# Patient Record
Sex: FEMALE | Race: WHITE | NOT HISPANIC OR LATINO | Employment: FULL TIME | URBAN - METROPOLITAN AREA
[De-identification: names, ages, dates, MRNs, and addresses within clinical notes are randomized per-mention and may not be internally consistent; named-entity substitution may affect disease eponyms.]

---

## 2023-09-02 PROBLEM — K21.9 GERD (GASTROESOPHAGEAL REFLUX DISEASE): Status: ACTIVE | Noted: 2023-09-02

## 2023-09-02 PROBLEM — R87.810 ASCUS WITH POSITIVE HIGH RISK HPV CERVICAL: Status: ACTIVE | Noted: 2023-09-02

## 2023-09-02 PROBLEM — R87.610 ASCUS WITH POSITIVE HIGH RISK HPV CERVICAL: Status: ACTIVE | Noted: 2023-09-02

## 2023-09-02 PROBLEM — N92.1 BREAKTHROUGH BLEEDING WITH IUD: Status: ACTIVE | Noted: 2023-09-02

## 2023-09-02 PROBLEM — K62.89 ANORECTAL PAIN: Status: ACTIVE | Noted: 2023-09-02

## 2023-09-02 PROBLEM — N93.8 DYSFUNCTIONAL UTERINE BLEEDING: Status: ACTIVE | Noted: 2023-09-02

## 2023-09-02 PROBLEM — A56.09 CHLAMYDIA TRACHOMATIS INFECTION OF LOWER GENITOURINARY SITE: Status: ACTIVE | Noted: 2023-09-02

## 2023-09-02 PROBLEM — F41.9 ANXIETY DISORDER: Status: ACTIVE | Noted: 2023-09-02

## 2023-09-02 PROBLEM — K60.2 ANAL FISSURE: Status: ACTIVE | Noted: 2023-09-02

## 2023-09-02 PROBLEM — Z97.5 BREAKTHROUGH BLEEDING WITH IUD: Status: ACTIVE | Noted: 2023-09-02

## 2023-09-02 PROBLEM — F90.9 ATTENTION-DEFICIT HYPERACTIVITY DISORDER, UNSPECIFIED TYPE: Status: ACTIVE | Noted: 2023-09-02

## 2023-09-02 PROBLEM — L65.9 HAIR LOSS: Status: ACTIVE | Noted: 2023-09-02

## 2023-09-02 PROBLEM — N90.89 VULVAR FISSURE: Status: ACTIVE | Noted: 2023-09-02

## 2023-09-02 RX ORDER — FLUOXETINE 10 MG/1
10 CAPSULE ORAL DAILY
COMMUNITY
Start: 2020-09-25 | End: 2023-10-23 | Stop reason: SDUPTHER

## 2023-09-02 RX ORDER — LEVONORGESTREL 19.5 MG/1
INTRAUTERINE DEVICE INTRAUTERINE
COMMUNITY

## 2023-09-02 RX ORDER — MULTIVITAMIN
TABLET ORAL
COMMUNITY

## 2023-09-02 RX ORDER — DEXTROAMPHETAMINE SACCHARATE, AMPHETAMINE ASPARTATE MONOHYDRATE, DEXTROAMPHETAMINE SULFATE AND AMPHETAMINE SULFATE 5; 5; 5; 5 MG/1; MG/1; MG/1; MG/1
1 CAPSULE, EXTENDED RELEASE ORAL DAILY
COMMUNITY
Start: 2022-06-16 | End: 2023-10-10 | Stop reason: SDUPTHER

## 2023-09-02 RX ORDER — SULFAMETHOXAZOLE AND TRIMETHOPRIM 800; 160 MG/1; MG/1
1 TABLET ORAL 2 TIMES DAILY
COMMUNITY
Start: 2022-07-18 | End: 2024-05-02 | Stop reason: WASHOUT

## 2023-09-27 LAB
CHLAMYDIA TRACH., AMPLIFIED: NEGATIVE
CLUE CELLS: NORMAL
N. GONORRHEA, AMPLIFIED: NEGATIVE
NUGENT SCORE: 0
YEAST: NORMAL

## 2023-10-09 PROBLEM — N89.8 VAGINAL DISCHARGE: Status: ACTIVE | Noted: 2023-10-09

## 2023-10-10 ENCOUNTER — TELEMEDICINE (OUTPATIENT)
Dept: BEHAVIORAL HEALTH | Facility: CLINIC | Age: 28
End: 2023-10-10
Payer: COMMERCIAL

## 2023-10-10 DIAGNOSIS — F41.3 OTHER MIXED ANXIETY DISORDERS: ICD-10-CM

## 2023-10-10 DIAGNOSIS — Z00.00 HEALTH CARE MAINTENANCE: ICD-10-CM

## 2023-10-10 DIAGNOSIS — F90.8 ATTENTION DEFICIT HYPERACTIVITY DISORDER (ADHD), OTHER TYPE: ICD-10-CM

## 2023-10-10 DIAGNOSIS — Z79.899 MEDICATION MANAGEMENT: ICD-10-CM

## 2023-10-10 PROCEDURE — 99214 OFFICE O/P EST MOD 30 MIN: CPT | Performed by: CLINICAL NURSE SPECIALIST

## 2023-10-10 RX ORDER — DEXTROAMPHETAMINE SACCHARATE, AMPHETAMINE ASPARTATE MONOHYDRATE, DEXTROAMPHETAMINE SULFATE AND AMPHETAMINE SULFATE 5; 5; 5; 5 MG/1; MG/1; MG/1; MG/1
20 CAPSULE, EXTENDED RELEASE ORAL EVERY MORNING
Qty: 30 CAPSULE | Refills: 0 | Status: SHIPPED | OUTPATIENT
Start: 2023-11-09 | End: 2024-02-01 | Stop reason: SDUPTHER

## 2023-10-10 RX ORDER — DEXTROAMPHETAMINE SACCHARATE, AMPHETAMINE ASPARTATE MONOHYDRATE, DEXTROAMPHETAMINE SULFATE AND AMPHETAMINE SULFATE 5; 5; 5; 5 MG/1; MG/1; MG/1; MG/1
20 CAPSULE, EXTENDED RELEASE ORAL EVERY MORNING
Qty: 30 CAPSULE | Refills: 0 | Status: SHIPPED | OUTPATIENT
Start: 2023-10-10 | End: 2024-02-01 | Stop reason: SDUPTHER

## 2023-10-10 RX ORDER — DEXTROAMPHETAMINE SACCHARATE, AMPHETAMINE ASPARTATE MONOHYDRATE, DEXTROAMPHETAMINE SULFATE AND AMPHETAMINE SULFATE 5; 5; 5; 5 MG/1; MG/1; MG/1; MG/1
20 CAPSULE, EXTENDED RELEASE ORAL EVERY MORNING
Qty: 30 CAPSULE | Refills: 0 | Status: SHIPPED | OUTPATIENT
Start: 2023-12-09 | End: 2024-02-01 | Stop reason: SDUPTHER

## 2023-10-10 NOTE — PROGRESS NOTES
"Outpatient Psychiatry      Subjective   Yumiko Paredes, is a 28 y.o. female,  seen in follow-up for ADHD and anxiety.       Assessment/Plan   Diagnoses:   Encounter Diagnoses   Name Primary?    Attention deficit hyperactivity disorder (ADHD), other type     Other mixed anxiety disorders        Assessment:    Tolerating and benefiting from current regimen. Can consider resuming low-dose fluoxetine for anxiety if sx persist or worsen. She prefers not to restart at this point, and instead would like to focus on nonpharmacologic strategies that have effectively mitigated anxiety in the past.     Will continue Adderall at current dose for ADHD.     Treatment Plan/Recommendations:   Continue Adderall ER 20 mg PO daily- refills sent to pharmacy today.   Orders for annual UDS (controlled rx monitoring) placed today.     Follow-up in 3 months.       Reason for Visit:  Follow-up for medication management.     HPI:  Since her last visit,     Past couple of weeks noticing anxiety has been really, really bad.  Not sure if it's because things slowed down at work and she had more time to think.   \"Just over-thinking everything,\" worrying about   This past week has been, \"scott back to normal.\"   Wasn't exercising or doing as much self-care because she was so busy, starting to get back into that more.     On days that anxiety was worse, seemed more difficult to get work done.   Noticed feeling more anxious toward the evening when medication was wearing off.   Sleeping well.   No significant changes in appetite or weight.   Energy level has been good.   No panic or anxiety attacks.     Denies suicidal ideation or a passive death wish.     No new medications or health problems.       Current Medications:    Current Outpatient Medications:     amphetamine-dextroamphetamine XR (Adderall XR) 20 mg 24 hr capsule, Take 1 capsule (20 mg) by mouth once daily. For ADHD, Disp: , Rfl:     FLUoxetine (PROzac) 10 mg capsule, Take 1 capsule (10 " "mg) by mouth once daily., Disp: , Rfl:     levonorgestreL (Kyleena) 17.5 mcg/24 hrs (5 yrs) 19.5 mg intrauterine device, by intrauterine route., Disp: , Rfl:     multivitamin tablet, Take by mouth., Disp: , Rfl:     sulfamethoxazole-trimethoprim (Bactrim DS) 800-160 mg tablet, Take 1 tablet by mouth 2 times a day., Disp: , Rfl:       Record Review: moderate     Medical Review Of Systems:  Pertinent items are noted in HPI.    Psychiatric Review Of Systems:  As noted in HPI.        Objective   Mental Status Exam  General: well groomed, appropriate eye contact  Psychomotor: no psychomotor agitation or retardation, no tremor or other abnormal movements  Speech: Clear, Non-pressured, and spontaneous  Mood: \"It's been pretty good.\"  Affect:euthymic, full-range and congruent with mood and topic of conversation  Thought Process: linear, goal directed  Perception: Denies auditory or visual hallucinations. Does not appear internally stimulated.   Thought Content: No suicidal ideation. No violent thinking. No delusional content.   Cognition: cognitively intact to conversational testing with respect to attention, orientation, fund of knowledge, recent and remote memory, and language  Insight: intact  Judgement: intact       Vitals:  There were no vitals filed for this visit.      Felisha Milton, APRN-CNP, APRN-CNS  "

## 2023-10-11 DIAGNOSIS — F90.8 ATTENTION DEFICIT HYPERACTIVITY DISORDER (ADHD), OTHER TYPE: ICD-10-CM

## 2023-10-11 DIAGNOSIS — F41.3 OTHER MIXED ANXIETY DISORDERS: ICD-10-CM

## 2023-10-23 ENCOUNTER — TELEPHONE (OUTPATIENT)
Dept: BEHAVIORAL HEALTH | Facility: CLINIC | Age: 28
End: 2023-10-23
Payer: COMMERCIAL

## 2023-10-23 DIAGNOSIS — F41.3 OTHER MIXED ANXIETY DISORDERS: ICD-10-CM

## 2023-10-23 RX ORDER — FLUOXETINE 10 MG/1
10 CAPSULE ORAL DAILY
Qty: 30 CAPSULE | Refills: 2 | Status: SHIPPED | OUTPATIENT
Start: 2023-10-23 | End: 2024-01-18

## 2024-01-09 ENCOUNTER — APPOINTMENT (OUTPATIENT)
Dept: BEHAVIORAL HEALTH | Facility: CLINIC | Age: 29
End: 2024-01-09
Payer: COMMERCIAL

## 2024-01-18 DIAGNOSIS — F41.3 OTHER MIXED ANXIETY DISORDERS: ICD-10-CM

## 2024-01-18 RX ORDER — FLUOXETINE 10 MG/1
CAPSULE ORAL
Qty: 30 CAPSULE | Refills: 2 | Status: SHIPPED | OUTPATIENT
Start: 2024-01-18 | End: 2024-02-01 | Stop reason: SDUPTHER

## 2024-02-01 ENCOUNTER — TELEMEDICINE (OUTPATIENT)
Dept: BEHAVIORAL HEALTH | Facility: CLINIC | Age: 29
End: 2024-02-01
Payer: COMMERCIAL

## 2024-02-01 DIAGNOSIS — F90.8 ATTENTION DEFICIT HYPERACTIVITY DISORDER (ADHD), OTHER TYPE: ICD-10-CM

## 2024-02-01 DIAGNOSIS — F41.3 OTHER MIXED ANXIETY DISORDERS: ICD-10-CM

## 2024-02-01 PROCEDURE — 99213 OFFICE O/P EST LOW 20 MIN: CPT | Performed by: CLINICAL NURSE SPECIALIST

## 2024-02-01 RX ORDER — FLUOXETINE 10 MG/1
10 CAPSULE ORAL DAILY
Qty: 30 CAPSULE | Refills: 5 | Status: SHIPPED | OUTPATIENT
Start: 2024-02-01 | End: 2024-07-30

## 2024-02-01 RX ORDER — DEXTROAMPHETAMINE SACCHARATE, AMPHETAMINE ASPARTATE MONOHYDRATE, DEXTROAMPHETAMINE SULFATE AND AMPHETAMINE SULFATE 5; 5; 5; 5 MG/1; MG/1; MG/1; MG/1
20 CAPSULE, EXTENDED RELEASE ORAL EVERY MORNING
Qty: 30 CAPSULE | Refills: 0 | Status: SHIPPED | OUTPATIENT
Start: 2024-02-23 | End: 2024-03-24

## 2024-02-01 RX ORDER — DEXTROAMPHETAMINE SACCHARATE, AMPHETAMINE ASPARTATE MONOHYDRATE, DEXTROAMPHETAMINE SULFATE AND AMPHETAMINE SULFATE 5; 5; 5; 5 MG/1; MG/1; MG/1; MG/1
20 CAPSULE, EXTENDED RELEASE ORAL EVERY MORNING
Qty: 30 CAPSULE | Refills: 0 | Status: SHIPPED | OUTPATIENT
Start: 2024-04-19 | End: 2024-05-19

## 2024-02-01 RX ORDER — DEXTROAMPHETAMINE SACCHARATE, AMPHETAMINE ASPARTATE MONOHYDRATE, DEXTROAMPHETAMINE SULFATE AND AMPHETAMINE SULFATE 5; 5; 5; 5 MG/1; MG/1; MG/1; MG/1
20 CAPSULE, EXTENDED RELEASE ORAL EVERY MORNING
Qty: 30 CAPSULE | Refills: 0 | Status: SHIPPED | OUTPATIENT
Start: 2024-03-22 | End: 2024-04-21

## 2024-02-01 NOTE — PROGRESS NOTES
Outpatient Psychiatry      Subjective   Yumiko Paredes, is a 28 y.o. female,  seen in follow-up.      Assessment/Plan   Problem List Items Addressed This Visit             ICD-10-CM    Anxiety disorder F41.9     Resumed low-dose fluoxetine 10 mg PO daily in late October 2023, with good benefit. Initially some sleep disruption that has since resolved. No indication for dose changes today.   Continue fluoxetine 10 mg PO daily- refill sent to pharmacy today.          Relevant Medications    FLUoxetine (PROzac) 10 mg capsule    Other Relevant Orders    Follow Up In Psychiatry    Attention-deficit hyperactivity disorder, unspecified type F90.9     Tolerating and benefiting from current regimen. No indication for medication changes today.    Continue Adderall XR 20 mg PO daily- refills placed on file at [facility].     Reviewed OARRS, no discrepancies or concerns. Discussed risk of insomnia, anxiety, agitation, anorexia, autonomic effects, toxicity, psychosis, dependence, tolerance, abuse.     Has not yet completed annual UDS- will do so prior to next visit. Orders on chart.          Relevant Medications    amphetamine-dextroamphetamine XR (Adderall XR) 20 mg 24 hr capsule (Start on 2/23/2024)    amphetamine-dextroamphetamine XR (Adderall XR) 20 mg 24 hr capsule (Start on 3/22/2024)    amphetamine-dextroamphetamine XR (Adderall XR) 20 mg 24 hr capsule (Start on 4/19/2024)    Other Relevant Orders    Follow Up In Psychiatry       Follow-up in 3 months.     Risk Assessment:  Risk Assessment: Yumiko Paredes is currently a low acute risk of suicide and self-harm due to no past suicide attempt(s) and not currently endorsing thoughts of suicide. Yumiko Paredes is currently a low acute risk of violence and harm to others due to no past history of violence and not currently threatening others.  Suicidal Risk Factors: , unmarried/single, living alone/lack of social support, and current psychiatric illness  Violence Risk  "Factors: none  Protective Factors: strong coping skills, sense of responsibility towards family, social support/connectedness, moral objections to suicide, positive family relationships, hopefulness/future orientation, and employment        Reason for Visit:  Follow-up for medication management.     HPI:  Since her last visit,     Doing pretty well.   Restarted fluoxetine in late October.   \"I think [the anxiety] was worse than I realized.\"   Since restarting, noticing less over-thinking, more focused, more productive.   Some issues with poorer sleep initially when restarted (broken sleep, difficulty falling asleep) that have since resolved.   Typically holds Adderall on weekends/off-days.     Work is good- was very busy during the holidays, but manageable.   Otherwise, no significant stressors or concerns today.     Denies suicidal ideation or a passive death wish.     No new medications or health problems.       Current Psychiatric Medications:  Adderall XR 20 mg PO daily  Fluoxetine 10 mg PO daily      Record Review: brief     Medical Review Of Systems:  Pertinent items are noted in HPI.    Psychiatric Review Of Systems:  As noted in HPI.        Objective   Mental Status Exam  General Appearance: Well groomed, appropriate eye contact  Attitude/Behavior: Cooperative  Motor: No psychomotor agitation or retardation, no tremor or other abnormal movements  Speech: Normal rate, volume, prosody  Gait/Station: Other:(comment) (not observed- virtual)  Mood: \"Pretty good.\"  Affect: Constricted, Congruent with mood and topic of conversation  Thought Process: Linear, goal directed  Thought Associations: No loosening of associations  Thought Content: Normal  Perception: No perceptual abnormalities noted  Sensorium: Alert and oriented to person, place, time and situation  Insight: Intact  Judgement: Intact  Cognition: Cognitively intact to conversational testing with respect to attention, orientation, fund of knowledge, recent " and remote memory, and language    Vitals:  There were no vitals filed for this visit.    Labs:  not applicable        Felisha Milton, APRN-CNP, APRN-CNS

## 2024-02-01 NOTE — ASSESSMENT & PLAN NOTE
Resumed low-dose fluoxetine 10 mg PO daily in late October 2023, with good benefit. Initially some sleep disruption that has since resolved. No indication for dose changes today.   Continue fluoxetine 10 mg PO daily- refill sent to pharmacy today.

## 2024-02-01 NOTE — ASSESSMENT & PLAN NOTE
Tolerating and benefiting from current regimen. No indication for medication changes today.    Continue Adderall XR 20 mg PO daily- refills placed on file at [facility].     Reviewed OARRS, no discrepancies or concerns. Discussed risk of insomnia, anxiety, agitation, anorexia, autonomic effects, toxicity, psychosis, dependence, tolerance, abuse.     Has not yet completed annual UDS- will do so prior to next visit. Orders on chart.

## 2024-05-02 ENCOUNTER — TELEMEDICINE (OUTPATIENT)
Dept: BEHAVIORAL HEALTH | Facility: CLINIC | Age: 29
End: 2024-05-02
Payer: COMMERCIAL

## 2024-05-02 DIAGNOSIS — Z79.899 MEDICATION MANAGEMENT: ICD-10-CM

## 2024-05-02 DIAGNOSIS — Z79.899 HIGH RISK MEDICATION USE: ICD-10-CM

## 2024-05-02 DIAGNOSIS — F41.3 OTHER MIXED ANXIETY DISORDERS: ICD-10-CM

## 2024-05-02 DIAGNOSIS — Z00.00 HEALTH CARE MAINTENANCE: ICD-10-CM

## 2024-05-02 DIAGNOSIS — Z00.00 HEALTH MAINTENANCE EXAMINATION: ICD-10-CM

## 2024-05-02 DIAGNOSIS — F43.23 ADJUSTMENT DISORDER WITH MIXED ANXIETY AND DEPRESSED MOOD: ICD-10-CM

## 2024-05-02 DIAGNOSIS — F90.8 ATTENTION DEFICIT HYPERACTIVITY DISORDER (ADHD), OTHER TYPE: ICD-10-CM

## 2024-05-02 PROCEDURE — 99215 OFFICE O/P EST HI 40 MIN: CPT | Performed by: CLINICAL NURSE SPECIALIST

## 2024-05-02 NOTE — ASSESSMENT & PLAN NOTE
"Possible that recent sx reported (lightheaded \"floating\" feeling) may be r/t anxiety in the context of multiple significant stressors. Discussed that it would be prudent to check EKG and baseline labs. Does not have PCP currently- encouraged to establish with a provider. Additionally discussed consideration for further titration of fluoxetine given recent increase in anxious sx. Declines medication changes today.   Check EKG, CBC w/diff, CMP- orders placed today.   Continue fluoxetine 10 mg PO daily- no refill needed today.   Continue engagement in individual therapy- consider increasing frequency of visits.   "

## 2024-05-02 NOTE — PROGRESS NOTES
"Outpatient Psychiatry      Subjective   Yumiko Paredes, is a 28 y.o. female,  seen in follow-up.      Assessment/Plan   Problem List Items Addressed This Visit             ICD-10-CM    Anxiety disorder F41.9     Possible that recent sx reported (lightheaded \"floating\" feeling) may be r/t anxiety in the context of multiple significant stressors. Discussed that it would be prudent to check EKG and baseline labs. Does not have PCP currently- encouraged to establish with a provider. Additionally discussed consideration for further titration of fluoxetine given recent increase in anxious sx. Declines medication changes today.   Check EKG, CBC w/diff, CMP- orders placed today.   Continue fluoxetine 10 mg PO daily- no refill needed today.   Continue engagement in individual therapy- consider increasing frequency of visits.          Relevant Orders    Follow Up In Psychiatry    Attention-deficit hyperactivity disorder, unspecified type F90.9     Low suspicion that recently reported sx are r/t a medication side effect, as she has been taking current medication at the same dose for several years. As noted above, will check EKG and baseline labs. Otherwise, continue current medication for now.    Continue Adderall XR 20 mg PO daily- refills placed on file at [facility].     Reviewed OARRS, no discrepancies or concerns. Discussed risk of insomnia, anxiety, agitation, anorexia, autonomic effects, toxicity, psychosis, dependence, tolerance, abuse.     Has not yet completed annual UDS- will do so prior to next visit. Orders on chart.          Relevant Orders    Follow Up In Psychiatry    Adjustment disorder with mixed anxiety and depressed mood F43.23    Relevant Orders    Follow Up In Psychiatry     Other Visit Diagnoses         Codes    Health care maintenance     Z00.00    Relevant Orders    ECG 12 Lead    High risk medication use     Z79.899    Relevant Orders    ECG 12 Lead    Medication management     Z79.899    Relevant " "Orders    CBC and Auto Differential    Comprehensive Metabolic Panel    Health maintenance examination     Z00.00    Relevant Orders    CBC and Auto Differential    Comprehensive Metabolic Panel            Follow-up in 4-6 weeks.     Risk Assessment:  Risk Assessment: Yumiko Paredes is currently a low acute risk of suicide and self-harm due to no past suicide attempt(s) and not currently endorsing thoughts of suicide. Yumiko Paredes is currently a low acute risk of violence and harm to others due to no past history of violence and not currently threatening others.  Suicidal Risk Factors: , unmarried/single, living alone/lack of social support, and current psychiatric illness  Violence Risk Factors: none  Protective Factors: strong coping skills, sense of responsibility towards family, social support/connectedness, moral objections to suicide, positive family relationships, hopefulness/future orientation, and employment      Reason for Visit:  Follow-up for medication management.     HPI:  Since her last visit,     \"A couple of big life changes.\"  Cousin she was very close with . Was very sick with cancer.  Stayed at cousin's home for a few weeks after she . Had been planning to house sit while cousin went through a clinical trial, and family asked if she'd still be willing to do so while they made arrangements.   When she returned home, moved into a new apartment.     Recently having some unusual sx, and not sure if they could be stress-related.   \"Vertigo-like\" sx that last for ~10 seconds; doesn't feel unsteady or like she's going to fall. \"Almost feels like all the blood has left my body, or like I'm floating.\"   No specific pattern- can happen any time of day, during any activity. Has noticed it occurring while she's in the middle of a conversation with someone.   No other associated sx (e.g. no sweating, palpitations, chest pain, nausea, dizziness, rapid or shallow breathing, visual " "changes)    Sleep has been poor- getting fewer hours of sleep than usual.   No change in appetite- thinks she may have gained some weight, \"that might just be stress-related.\"  Drinks 1 cup of coffee/daily  ~32 oz of water daily  Denies significant depressive sx (denies anergia, anhedonia, disinterest, guilt, helplessness, hopelessness). Denies suicidal ideation or a passive death wish.     No new medications or health problems.       Current Psychiatric Medications:  Adderall XR 20 mg PO daily  Fluoxetine 10 mg PO daily      Record Review: brief     Medical Review Of Systems:  Pertinent items are noted in HPI.    Psychiatric Review Of Systems:  As noted in HPI.        Objective   Mental Status Exam  General Appearance: Well groomed, appropriate eye contact  Attitude/Behavior: Cooperative  Motor: No psychomotor agitation or retardation, no tremor or other abnormal movements  Speech: Normal rate, volume, prosody  Gait/Station: Other:(comment) (not observed- virtual)  Mood: \"I think I've been a little bit stressed.\"  Affect: Constricted, Anxious, Congruent with mood and topic of conversation  Thought Process: Linear, goal directed  Thought Associations: No loosening of associations  Thought Content: Other: (comment) (grief, anxious themes r/t cousin's recent death, recent physical sx she's experiencing, recent move.)  Perception: No perceptual abnormalities noted  Sensorium: Alert and oriented to person, place, time and situation  Insight: Intact  Judgement: Intact  Cognition: Cognitively intact to conversational testing with respect to attention, orientation, fund of knowledge, recent and remote memory, and language    Vitals:  There were no vitals filed for this visit.    Labs:  not applicable        Felisha Milton, APRN-CNP, APRN-CNS  "

## 2024-05-02 NOTE — ASSESSMENT & PLAN NOTE
Low suspicion that recently reported sx are r/t a medication side effect, as she has been taking current medication at the same dose for several years. As noted above, will check EKG and baseline labs. Otherwise, continue current medication for now.    Continue Adderall XR 20 mg PO daily- refills placed on file at [facility].     Reviewed OARRS, no discrepancies or concerns. Discussed risk of insomnia, anxiety, agitation, anorexia, autonomic effects, toxicity, psychosis, dependence, tolerance, abuse.     Has not yet completed annual UDS- will do so prior to next visit. Orders on chart.

## 2024-06-07 ENCOUNTER — HOSPITAL ENCOUNTER (OUTPATIENT)
Dept: CARDIOLOGY | Facility: HOSPITAL | Age: 29
Discharge: HOME | End: 2024-06-07
Payer: COMMERCIAL

## 2024-06-07 DIAGNOSIS — Z00.00 HEALTH CARE MAINTENANCE: ICD-10-CM

## 2024-06-07 DIAGNOSIS — Z79.899 HIGH RISK MEDICATION USE: ICD-10-CM

## 2024-06-07 LAB
ATRIAL RATE: 79 BPM
P AXIS: 71 DEGREES
P OFFSET: 204 MS
P ONSET: 161 MS
PR INTERVAL: 114 MS
Q ONSET: 218 MS
QRS COUNT: 13 BEATS
QRS DURATION: 92 MS
QT INTERVAL: 388 MS
QTC CALCULATION(BAZETT): 444 MS
QTC FREDERICIA: 425 MS
R AXIS: 93 DEGREES
T AXIS: 69 DEGREES
T OFFSET: 412 MS
VENTRICULAR RATE: 79 BPM

## 2024-06-07 PROCEDURE — 93005 ELECTROCARDIOGRAM TRACING: CPT

## 2024-06-13 ENCOUNTER — APPOINTMENT (OUTPATIENT)
Dept: BEHAVIORAL HEALTH | Facility: CLINIC | Age: 29
End: 2024-06-13
Payer: COMMERCIAL

## 2024-07-17 LAB
ALBUMIN SERPL BCP-MCNC: 4.4 G/DL (ref 3.4–5)
ALP SERPL-CCNC: 37 U/L (ref 33–110)
ALT SERPL W P-5'-P-CCNC: 9 U/L (ref 7–45)
ANION GAP SERPL CALC-SCNC: 14 MMOL/L (ref 10–20)
APPEARANCE UR: CLEAR
AST SERPL W P-5'-P-CCNC: 15 U/L (ref 9–39)
BACTERIA #/AREA URNS AUTO: ABNORMAL /HPF
BASOPHILS # BLD AUTO: 0.08 X10*3/UL (ref 0–0.1)
BASOPHILS NFR BLD AUTO: 1.2 %
BILIRUB SERPL-MCNC: 0.8 MG/DL (ref 0–1.2)
BILIRUB UR STRIP.AUTO-MCNC: NEGATIVE MG/DL
BUN SERPL-MCNC: 9 MG/DL (ref 6–23)
CALCIUM SERPL-MCNC: 9.5 MG/DL (ref 8.6–10.3)
CHLORIDE SERPL-SCNC: 103 MMOL/L (ref 98–107)
CLUE CELLS SPEC QL WET PREP: NORMAL
CO2 SERPL-SCNC: 24 MMOL/L (ref 21–32)
COLOR UR: ABNORMAL
CREAT SERPL-MCNC: 0.71 MG/DL (ref 0.5–1.05)
EGFRCR SERPLBLD CKD-EPI 2021: >90 ML/MIN/1.73M*2
EOSINOPHIL # BLD AUTO: 0.15 X10*3/UL (ref 0–0.7)
EOSINOPHIL NFR BLD AUTO: 2.2 %
ERYTHROCYTE [DISTWIDTH] IN BLOOD BY AUTOMATED COUNT: 12.2 % (ref 11.5–14.5)
GLUCOSE SERPL-MCNC: 86 MG/DL (ref 74–99)
GLUCOSE UR STRIP.AUTO-MCNC: NORMAL MG/DL
HCG UR QL IA.RAPID: NEGATIVE
HCT VFR BLD AUTO: 37.6 % (ref 36–46)
HGB BLD-MCNC: 12.9 G/DL (ref 12–16)
IMM GRANULOCYTES # BLD AUTO: 0.01 X10*3/UL (ref 0–0.7)
IMM GRANULOCYTES NFR BLD AUTO: 0.1 % (ref 0–0.9)
KETONES UR STRIP.AUTO-MCNC: NEGATIVE MG/DL
LACTATE SERPL-SCNC: 0.6 MMOL/L (ref 0.4–2)
LEUKOCYTE ESTERASE UR QL STRIP.AUTO: NEGATIVE
LIPASE SERPL-CCNC: 12 U/L (ref 9–82)
LYMPHOCYTES # BLD AUTO: 2.19 X10*3/UL (ref 1.2–4.8)
LYMPHOCYTES NFR BLD AUTO: 32.8 %
MCH RBC QN AUTO: 31.8 PG (ref 26–34)
MCHC RBC AUTO-ENTMCNC: 34.3 G/DL (ref 32–36)
MCV RBC AUTO: 93 FL (ref 80–100)
MONOCYTES # BLD AUTO: 0.53 X10*3/UL (ref 0.1–1)
MONOCYTES NFR BLD AUTO: 7.9 %
MUCOUS THREADS #/AREA URNS AUTO: ABNORMAL /LPF
NEUTROPHILS # BLD AUTO: 3.72 X10*3/UL (ref 1.2–7.7)
NEUTROPHILS NFR BLD AUTO: 55.8 %
NITRITE UR QL STRIP.AUTO: NEGATIVE
NRBC BLD-RTO: 0 /100 WBCS (ref 0–0)
PH UR STRIP.AUTO: 6 [PH]
PLATELET # BLD AUTO: 291 X10*3/UL (ref 150–450)
POTASSIUM SERPL-SCNC: 3.8 MMOL/L (ref 3.5–5.3)
PROT SERPL-MCNC: 6.7 G/DL (ref 6.4–8.2)
PROT UR STRIP.AUTO-MCNC: NEGATIVE MG/DL
RBC # BLD AUTO: 4.06 X10*6/UL (ref 4–5.2)
RBC # UR STRIP.AUTO: ABNORMAL /UL
RBC #/AREA URNS AUTO: ABNORMAL /HPF
SODIUM SERPL-SCNC: 137 MMOL/L (ref 136–145)
SP GR UR STRIP.AUTO: 1.02
SQUAMOUS #/AREA URNS AUTO: ABNORMAL /HPF
T VAGINALIS SPEC QL WET PREP: NORMAL
UROBILINOGEN UR STRIP.AUTO-MCNC: NORMAL MG/DL
WBC # BLD AUTO: 6.7 X10*3/UL (ref 4.4–11.3)
WBC #/AREA URNS AUTO: ABNORMAL /HPF
WBC VAG QL WET PREP: NORMAL
YEAST VAG QL WET PREP: NORMAL

## 2024-07-17 PROCEDURE — 85025 COMPLETE CBC W/AUTO DIFF WBC: CPT | Performed by: NURSE PRACTITIONER

## 2024-07-17 PROCEDURE — 80053 COMPREHEN METABOLIC PANEL: CPT | Performed by: NURSE PRACTITIONER

## 2024-07-17 PROCEDURE — 83605 ASSAY OF LACTIC ACID: CPT | Performed by: NURSE PRACTITIONER

## 2024-07-17 PROCEDURE — 81001 URINALYSIS AUTO W/SCOPE: CPT | Performed by: NURSE PRACTITIONER

## 2024-07-17 PROCEDURE — 36415 COLL VENOUS BLD VENIPUNCTURE: CPT | Performed by: NURSE PRACTITIONER

## 2024-07-17 PROCEDURE — 81025 URINE PREGNANCY TEST: CPT | Performed by: NURSE PRACTITIONER

## 2024-07-17 PROCEDURE — 99285 EMERGENCY DEPT VISIT HI MDM: CPT

## 2024-07-17 PROCEDURE — 87491 CHLMYD TRACH DNA AMP PROBE: CPT | Mod: AHULAB | Performed by: NURSE PRACTITIONER

## 2024-07-17 PROCEDURE — 87210 SMEAR WET MOUNT SALINE/INK: CPT | Performed by: NURSE PRACTITIONER

## 2024-07-17 PROCEDURE — 83690 ASSAY OF LIPASE: CPT | Performed by: NURSE PRACTITIONER

## 2024-07-17 RX ORDER — KETOROLAC TROMETHAMINE 30 MG/ML
15 INJECTION, SOLUTION INTRAMUSCULAR; INTRAVENOUS ONCE
Status: DISCONTINUED | OUTPATIENT
Start: 2024-07-17 | End: 2024-07-18 | Stop reason: HOSPADM

## 2024-07-17 ASSESSMENT — PAIN SCALES - GENERAL: PAINLEVEL_OUTOF10: 8

## 2024-07-17 ASSESSMENT — PAIN DESCRIPTION - DESCRIPTORS: DESCRIPTORS: SHOOTING

## 2024-07-17 ASSESSMENT — PAIN - FUNCTIONAL ASSESSMENT: PAIN_FUNCTIONAL_ASSESSMENT: 0-10

## 2024-07-17 ASSESSMENT — COLUMBIA-SUICIDE SEVERITY RATING SCALE - C-SSRS
6. HAVE YOU EVER DONE ANYTHING, STARTED TO DO ANYTHING, OR PREPARED TO DO ANYTHING TO END YOUR LIFE?: NO
2. HAVE YOU ACTUALLY HAD ANY THOUGHTS OF KILLING YOURSELF?: NO
1. IN THE PAST MONTH, HAVE YOU WISHED YOU WERE DEAD OR WISHED YOU COULD GO TO SLEEP AND NOT WAKE UP?: NO

## 2024-07-17 ASSESSMENT — PAIN DESCRIPTION - ORIENTATION: ORIENTATION: RIGHT;LOWER

## 2024-07-17 ASSESSMENT — PAIN DESCRIPTION - LOCATION: LOCATION: ABDOMEN

## 2024-07-18 ENCOUNTER — APPOINTMENT (OUTPATIENT)
Dept: RADIOLOGY | Facility: HOSPITAL | Age: 29
End: 2024-07-18
Payer: COMMERCIAL

## 2024-07-18 ENCOUNTER — HOSPITAL ENCOUNTER (EMERGENCY)
Facility: HOSPITAL | Age: 29
Discharge: HOME | End: 2024-07-18
Attending: EMERGENCY MEDICINE
Payer: COMMERCIAL

## 2024-07-18 VITALS
DIASTOLIC BLOOD PRESSURE: 76 MMHG | TEMPERATURE: 98.1 F | HEIGHT: 64 IN | HEART RATE: 64 BPM | SYSTOLIC BLOOD PRESSURE: 128 MMHG | WEIGHT: 125 LBS | BODY MASS INDEX: 21.34 KG/M2 | OXYGEN SATURATION: 100 % | RESPIRATION RATE: 17 BRPM

## 2024-07-18 DIAGNOSIS — R10.31 RIGHT LOWER QUADRANT ABDOMINAL PAIN: Primary | ICD-10-CM

## 2024-07-18 LAB
C TRACH RRNA SPEC QL NAA+PROBE: NEGATIVE
N GONORRHOEA DNA SPEC QL PROBE+SIG AMP: NEGATIVE

## 2024-07-18 PROCEDURE — 76856 US EXAM PELVIC COMPLETE: CPT | Performed by: RADIOLOGY

## 2024-07-18 PROCEDURE — 74177 CT ABD & PELVIS W/CONTRAST: CPT

## 2024-07-18 PROCEDURE — 76856 US EXAM PELVIC COMPLETE: CPT

## 2024-07-18 PROCEDURE — 2550000001 HC RX 255 CONTRASTS: Performed by: EMERGENCY MEDICINE

## 2024-07-18 PROCEDURE — 76830 TRANSVAGINAL US NON-OB: CPT | Performed by: RADIOLOGY

## 2024-07-18 PROCEDURE — 74177 CT ABD & PELVIS W/CONTRAST: CPT | Performed by: RADIOLOGY

## 2024-07-18 RX ADMIN — IOHEXOL 75 ML: 350 INJECTION, SOLUTION INTRAVENOUS at 02:39

## 2024-07-18 ASSESSMENT — PAIN SCALES - GENERAL: PAINLEVEL_OUTOF10: 6

## 2024-07-18 NOTE — ED PROVIDER NOTES
Chief Complaint   Patient presents with    Abdominal Pain     Right Lower Quadrant     HPI:   Yumiko Paredes is an 28 y.o. female with a history of depression and ADHD presenting to the ED with chief complaint of abdominal pain.  She explains that yesterday she woke up with abdominal pain in her right lower quadrant.  The pain is worsened with walking and when she bears down and alleviated with rest.  She denies pelvic complaints.  She denies bleeding or discharge.  No dysuria or change in bowel or bladder.  She did have an IUD placed about a year ago.  She is sexually active.  Denies fever chills or sweats.  She does have a history of an ovarian cyst that did burst previously.    No Known Allergies:  Past Medical History:   Diagnosis Date    Anxiety disorder, unspecified     Anxiety and depression    Atypical squamous cells of undetermined significance on cytologic smear of cervix (ASC-US) 08/09/2018    ASCUS with positive high risk HPV cervical    Encounter for pregnancy test, result unknown     Encounter for pregnancy test    Other specified health status     Patient denies medical problems    Personal history of other diseases of the digestive system 08/10/2018    History of anal fissures     Past Surgical History:   Procedure Laterality Date    OTHER SURGICAL HISTORY  10/21/2013    Incision And Drainage Of Pilonidal Cyst     Family History   Problem Relation Name Age of Onset    No Known Problems Mother      Breast cancer Maternal Cousin          Physical Exam  Vitals and nursing note reviewed.   Constitutional:       General: She is not in acute distress.     Appearance: She is well-developed.   HENT:      Head: Normocephalic and atraumatic.   Eyes:      Conjunctiva/sclera: Conjunctivae normal.   Cardiovascular:      Rate and Rhythm: Normal rate and regular rhythm.      Heart sounds: Normal heart sounds. No murmur heard.  Pulmonary:      Effort: Pulmonary effort is normal. No respiratory distress.      Breath  sounds: Normal breath sounds.   Abdominal:      Palpations: Abdomen is soft.      Tenderness: There is no abdominal tenderness. Negative signs include Nails's sign, Rovsing's sign, McBurney's sign, psoas sign and obturator sign.      Comments: No abdominal tenderness however she is tender in the right inguinal canal but there is no obvious bulge or palpable masses in the area.    Musculoskeletal:         General: No swelling.      Cervical back: Neck supple.   Skin:     General: Skin is warm and dry.      Capillary Refill: Capillary refill takes less than 2 seconds.   Neurological:      General: No focal deficit present.      Mental Status: She is alert.   Psychiatric:         Mood and Affect: Mood normal.        VS: As documented in the triage note and EMR flowsheet from this visit were reviewed.      Medical Decision Making: This is a 20-year-old female presenting to the ED with chief complaint of right lower quadrant abdominal pain that started yesterday when she woke up.  The pain is worse with straining and walking and alleviated with rest.  On exam her vitals are stable she is well-appearing in no acute distress.  Abdomen is soft and nontender she does have tenderness over the right inguinal area negative McBurney obturator Rovsing and psoas sign.  Her physical is essentially normal her lab work did not show leukocytosis however she is currently on Keflex for a cut on her right leg.  Basic labs were within normal limits lipase and lactate were normal.  Ultrasound showed ovarian cyst on the left ovary however she is not having any pain in this area.  Urinalysis was negative and swabs were negative.  We will send out for GC CT. patient was updated on her results and she was requesting to leave.  Her recommended the patient stay for abdominal CT to rule out appendicitis she explains she can come back tomorrow.  I did convince the patient to stay and get the CT however it was negative.  She is reassured by her  results and she will follow-up with her primary care physician.  Discussed the possibility that maybe she had a cyst on her right ovary that had ruptured or musculoskeletal in nature.  Differential includes hernia, appendicitis, torsion  Diagnoses as of 07/18/24 0207   Right lower quadrant abdominal pain     Counseling: Spoke with the patient and discussed today´s findings, in addition to providing specific details for the plan of care and expected course.  Patient was given the opportunity to ask questions.    Discussed return precautions and importance of follow-up.  Advised to follow-up with PCP.  I specifically advised to return to the ED for changing or worsening symptoms, new symptoms, complaint specific precautions, and precautions listed on the discharge paperwork.  Educated on the common potential side effects of medications prescribed.    I advised the patient that the emergency evaluation and treatment provided today doesn't end their need for medical care. It is very important that they follow-up with their primary care provider or other specialist as instructed.    The plan of care was mutually agreed upon with the patient. The patient and/or family were given the opportunity to ask questions. All questions asked today in the ED were answered to the best of my ability with today's information.    This patient was cared for in the setting of nationwide stress on resources and staffing.    This report was transcribed using voice recognition software.  Every effort was made to ensure accuracy, however, inadvertently computerized transcription errors may be present.       Sky Moody PA-C  07/18/24 0216       Sky Moody PA-C  07/18/24 0332

## 2024-07-18 NOTE — ED TRIAGE NOTES
Pt presents to ED from home with complaints of right lower quadrant pain. Pt states the pain began yesterday 7/16 with light cramping, today the pain has gotten progressively worst in the last couple of hours., Pt states she is taking cephalexin for cut on her leg. Pt denies N/V/D but endorses weakness and fatigue in the last couple of hours.

## 2024-07-18 NOTE — ED TRIAGE NOTES
TRIAGE NOTE   I saw the patient as the Clinician in Triage and performed a brief history and physical exam, established acuity, and ordered appropriate tests to develop basic plan of care. Patient will be seen by an [facility], resident and/or physician who will independently evaluate the patient. Please see subsequent provider notes for further details and disposition.     Brief HPI: In brief, Yumiko Paredes is a 28 y.o. female that presents for cramping that started in the center of her stomach and worsened today.  She is endorsing right lower pelvic pain.  In May 2022 she had an ultrasound that showed an ovarian cyst that had burst.  At that time she was experiencing vaginal bleeding.  Today she is not experiencing vaginal bleeding.  The patient has a IUD in place that was replaced since the IUD there was confirmed May 2022.  She last had intercourse 1 week ago with a partner of 2 months.  She denies fever or chills.  She denies pharyngitis.  She denies chest pain or dyspnea.  She endorses abdominal pain which she is rating 8 out of 10.  Pain is intermittent but when she is up and moving around that is when the pain increases..     Focused Physical exam:   McBurney point is negative.  Psoas is negative.  Rovsing's negative.  Obturator is negative.  Abdomen is soft and not peritonitic.  I had patient self swab.  Remaining physical exam is normal.  Plan/MDM:   I requested an ultrasound of her pelvis with history of ovarian cyst that had burst on the right side.  At this time I did not order a CT abdomen pelvis but instead ordered labs including a lactate, CBC CMP lipase urinalysis and urine pregnancy.  If any of these are abnormal we can move to CT scan.  She received 15 mg of Toradol for her pain.  Please see subsequent provider note for further details and disposition

## 2024-07-25 ENCOUNTER — HOSPITAL ENCOUNTER (OUTPATIENT)
Dept: RADIOLOGY | Facility: HOSPITAL | Age: 29
Discharge: HOME | End: 2024-07-25
Payer: COMMERCIAL

## 2024-07-25 DIAGNOSIS — S81.812A LACERATION WITHOUT FOREIGN BODY, LEFT LOWER LEG, INITIAL ENCOUNTER: ICD-10-CM

## 2024-07-25 PROCEDURE — 73718 MRI LOWER EXTREMITY W/O DYE: CPT | Mod: LT

## 2024-07-28 DIAGNOSIS — F41.3 OTHER MIXED ANXIETY DISORDERS: ICD-10-CM

## 2024-07-29 RX ORDER — FLUOXETINE 10 MG/1
CAPSULE ORAL
Qty: 30 CAPSULE | Refills: 5 | Status: SHIPPED | OUTPATIENT
Start: 2024-07-29

## 2024-08-19 ENCOUNTER — TELEPHONE (OUTPATIENT)
Dept: OTHER | Age: 29
End: 2024-08-19
Payer: COMMERCIAL

## 2024-08-19 NOTE — TELEPHONE ENCOUNTER
Caller: dom    Adderall XR 20mgs    Pharmacy:  [place] [phone]    Next visit: 9.18.24

## 2024-08-21 DIAGNOSIS — F90.8 ATTENTION DEFICIT HYPERACTIVITY DISORDER (ADHD), OTHER TYPE: ICD-10-CM

## 2024-08-21 RX ORDER — DEXTROAMPHETAMINE SACCHARATE, AMPHETAMINE ASPARTATE MONOHYDRATE, DEXTROAMPHETAMINE SULFATE AND AMPHETAMINE SULFATE 5; 5; 5; 5 MG/1; MG/1; MG/1; MG/1
20 CAPSULE, EXTENDED RELEASE ORAL EVERY MORNING
Qty: 30 CAPSULE | Refills: 0 | Status: SHIPPED | OUTPATIENT
Start: 2024-08-21 | End: 2024-09-20

## 2024-08-21 RX ORDER — DEXTROAMPHETAMINE SACCHARATE, AMPHETAMINE ASPARTATE MONOHYDRATE, DEXTROAMPHETAMINE SULFATE AND AMPHETAMINE SULFATE 5; 5; 5; 5 MG/1; MG/1; MG/1; MG/1
20 CAPSULE, EXTENDED RELEASE ORAL EVERY MORNING
Qty: 30 CAPSULE | Refills: 0 | Status: SHIPPED | OUTPATIENT
Start: 2024-09-18 | End: 2024-10-18

## 2024-09-10 PROBLEM — M54.50 LOW BACK PAIN: Status: ACTIVE | Noted: 2024-09-10

## 2024-09-10 PROBLEM — K21.9 GASTROESOPHAGEAL REFLUX DISEASE: Status: ACTIVE | Noted: 2024-09-10

## 2024-09-10 PROBLEM — R39.9 SYMPTOMS INVOLVING URINARY SYSTEM: Status: ACTIVE | Noted: 2024-09-10

## 2024-09-10 PROBLEM — S81.812A LACERATION WITHOUT FOREIGN BODY, LEFT LOWER LEG, INITIAL ENCOUNTER: Status: ACTIVE | Noted: 2024-07-13

## 2024-09-10 PROBLEM — A56.00 CHLAMYDIAL INFECTION OF LOWER GENITOURINARY TRACT: Status: ACTIVE | Noted: 2024-09-10

## 2024-09-10 PROBLEM — F90.0 ATTENTION DEFICIT HYPERACTIVITY DISORDER (ADHD), PREDOMINANTLY INATTENTIVE TYPE: Status: ACTIVE | Noted: 2024-09-10

## 2024-09-10 PROBLEM — L65.9 LOSS OF HAIR: Status: ACTIVE | Noted: 2024-09-10

## 2024-09-10 PROBLEM — N93.9 ABNORMAL UTERINE BLEEDING: Status: ACTIVE | Noted: 2024-09-10

## 2024-09-10 PROBLEM — N90.9 DISORDER OF VULVA: Status: ACTIVE | Noted: 2024-09-10

## 2024-09-10 PROBLEM — Z78.9 USES CONTRACEPTION: Status: ACTIVE | Noted: 2024-09-10

## 2024-09-10 PROBLEM — N92.1 BREAKTHROUGH BLEEDING: Status: ACTIVE | Noted: 2024-09-10

## 2024-09-10 RX ORDER — CEPHALEXIN 500 MG/1
CAPSULE ORAL
COMMUNITY
Start: 2024-07-13

## 2024-09-10 RX ORDER — CEPHALEXIN 500 MG/1
1 CAPSULE ORAL
COMMUNITY
Start: 2024-07-30

## 2024-09-10 RX ORDER — OXYCODONE AND ACETAMINOPHEN 5; 325 MG/1; MG/1
TABLET ORAL
COMMUNITY
Start: 2024-07-31

## 2024-09-10 RX ORDER — FLUCONAZOLE 150 MG/1
TABLET ORAL
COMMUNITY
Start: 2023-08-24

## 2024-09-10 RX ORDER — TOBRAMYCIN AND DEXAMETHASONE 3; 1 MG/ML; MG/ML
SUSPENSION/ DROPS OPHTHALMIC
COMMUNITY
Start: 2024-07-01

## 2024-09-10 RX ORDER — NITROFURANTOIN 25; 75 MG/1; MG/1
CAPSULE ORAL
COMMUNITY
Start: 2022-08-07

## 2024-09-18 ENCOUNTER — APPOINTMENT (OUTPATIENT)
Dept: BEHAVIORAL HEALTH | Facility: CLINIC | Age: 29
End: 2024-09-18
Payer: COMMERCIAL

## 2024-09-18 DIAGNOSIS — F90.8 ATTENTION DEFICIT HYPERACTIVITY DISORDER (ADHD), OTHER TYPE: ICD-10-CM

## 2024-09-18 DIAGNOSIS — F43.23 ADJUSTMENT DISORDER WITH MIXED ANXIETY AND DEPRESSED MOOD: ICD-10-CM

## 2024-09-18 DIAGNOSIS — F41.3 OTHER MIXED ANXIETY DISORDERS: ICD-10-CM

## 2024-09-18 PROCEDURE — 1036F TOBACCO NON-USER: CPT | Performed by: CLINICAL NURSE SPECIALIST

## 2024-09-18 PROCEDURE — 99213 OFFICE O/P EST LOW 20 MIN: CPT | Performed by: CLINICAL NURSE SPECIALIST

## 2024-09-18 NOTE — ASSESSMENT & PLAN NOTE
Tolerating and benefiting from current regimen. No indication for medication changes today.      Continue Adderall XR 20 mg PO daily- refills on file at [facility].     Reviewed OARRS, no discrepancies or concerns. Discussed risk of insomnia, anxiety, agitation, anorexia, autonomic effects, toxicity, psychosis, dependence, tolerance, abuse.     Has not yet completed annual UDS- will do so prior to next visit. Orders on chart.

## 2024-09-18 NOTE — ASSESSMENT & PLAN NOTE
No longer reporting vertigo-like sx. Reviewed EKG, CBC, CMP. No concerning results.   Does not have PCP currently- encouraged to establish with a provider.     Tolerating and benefiting from current regimen. No indication for medication changes today.      Continue fluoxetine 10 mg PO daily- no refill needed today.   Continue engagement in individual therapy.   negative - no nasal congestion

## 2024-09-18 NOTE — PROGRESS NOTES
Outpatient Psychiatry      Subjective   Yumiko Paredes, is a 28 y.o. female,  seen in follow-up.      Assessment/Plan   Problem List Items Addressed This Visit             ICD-10-CM    Anxiety disorder F41.9     No longer reporting vertigo-like sx. Reviewed EKG, CBC, CMP. No concerning results.   Does not have PCP currently- encouraged to establish with a provider.     Tolerating and benefiting from current regimen. No indication for medication changes today.      Continue fluoxetine 10 mg PO daily- no refill needed today.   Continue engagement in individual therapy.         Relevant Orders    Follow Up In Psychiatry    Attention-deficit hyperactivity disorder, unspecified type F90.9     Tolerating and benefiting from current regimen. No indication for medication changes today.      Continue Adderall XR 20 mg PO daily- refills on file at [facility].     Reviewed OARRS, no discrepancies or concerns. Discussed risk of insomnia, anxiety, agitation, anorexia, autonomic effects, toxicity, psychosis, dependence, tolerance, abuse.     Has not yet completed annual UDS- will do so prior to next visit. Orders on chart.          Relevant Orders    Follow Up In Psychiatry    Adjustment disorder with mixed anxiety and depressed mood F43.23     Sx largely resolved. Continue plan as noted above.          Relevant Orders    Follow Up In Psychiatry       Follow-up in 3 months.     Risk Assessment:  Risk Assessment: Yumiko Paredes is currently a low acute risk of suicide and self-harm due to no past suicide attempt(s) and not currently endorsing thoughts of suicide. Yumiko Paredes is currently a low acute risk of violence and harm to others due to no past history of violence and not currently threatening others.  Suicidal Risk Factors: , unmarried/single, living alone/lack of social support, and current psychiatric illness  Violence Risk Factors: none  Protective Factors: strong coping skills, sense of responsibility towards  "family, social support/connectedness, moral objections to suicide, positive family relationships, hopefulness/future orientation, and employment      Reason for Visit:  Follow-up for medication management.     HPI:  Since her last visit,     Mood has been, \"as good as it can be I think.\"  Overall, feeling less anxious.   Pretty much settled into new apartment.   Work has been going well.   Has not been experiencing any further vertigo-like sx   Stopped shortly after last visit (~late May/early June)  Suspects likely r/t stress, not drinking enough water, \"just not taking care of myself as well as I normally do.\"     Doing well with medications. No new concerns today otherwise.     Denies suicidal ideation or a passive death wish.     Had to have surgery on her leg to repair severed tendon in her leg. Sustained a small cut on glass when a friend's coffee table broke. Will start PT in the near future. Otherwise, no new medications or health problems.       Current Psychiatric Medications:  Adderall XR 20 mg PO daily  Fluoxetine 10 mg PO daily      Record Review: brief     Medical Review Of Systems:  Pertinent items are noted in HPI.    Psychiatric Review Of Systems:  As noted in HPI.        Objective   Mental Status Exam  General Appearance: Well groomed, appropriate eye contact  Attitude/Behavior: Cooperative  Motor: No psychomotor agitation or retardation, no tremor or other abnormal movements  Speech: Normal rate, volume, prosody  Gait/Station: Other:(comment) (not observed- virtual)  Mood: \"as good as it can be I think.\"  Affect: Euthymic, full-range, Congruent with mood and topic of conversation  Thought Process: Linear, goal directed  Thought Associations: No loosening of associations  Thought Content: Normal  Perception: No perceptual abnormalities noted  Insight: Intact  Judgement: Intact  Cognition: Cognitively intact to conversational testing with respect to attention, orientation, fund of knowledge, recent " and remote memory, and language    Vitals:  There were no vitals filed for this visit.    Labs:  not applicable      Felisha Milton, APRN-CNP, APRN-CNS

## 2024-10-07 ENCOUNTER — TELEPHONE (OUTPATIENT)
Dept: BEHAVIORAL HEALTH | Facility: CLINIC | Age: 29
End: 2024-10-07
Payer: COMMERCIAL

## 2024-10-07 NOTE — PROGRESS NOTES
Patient needs amphetamine-dextroamphetamine XR (Adderall XR) 20 mg 24 hr capsule is on manufactory backorder. Pharmacy said to send in prescription for two 10's daily instead of 1 20 daily so they're able to fill it.

## 2024-10-09 DIAGNOSIS — F90.0 ATTENTION DEFICIT HYPERACTIVITY DISORDER (ADHD), PREDOMINANTLY INATTENTIVE TYPE: ICD-10-CM

## 2024-10-09 RX ORDER — DEXTROAMPHETAMINE SACCHARATE, AMPHETAMINE ASPARTATE MONOHYDRATE, DEXTROAMPHETAMINE SULFATE AND AMPHETAMINE SULFATE 2.5; 2.5; 2.5; 2.5 MG/1; MG/1; MG/1; MG/1
20 CAPSULE, EXTENDED RELEASE ORAL DAILY
Qty: 60 CAPSULE | Refills: 0 | Status: SHIPPED | OUTPATIENT
Start: 2024-10-09 | End: 2024-11-08

## 2024-10-10 ENCOUNTER — TELEPHONE (OUTPATIENT)
Dept: BEHAVIORAL HEALTH | Facility: CLINIC | Age: 29
End: 2024-10-10
Payer: COMMERCIAL

## 2024-10-10 NOTE — PROGRESS NOTES
PROVIDER: jamin  MEDICATION/DOSAGE:     amphetamine-dextroamphetamine XR (Adderall XR) 10 mg 24 hr capsule     QTY/SUPPLY: 60/30  PRIOR AUTH COMPLETED VIA: [employer]  INSURANCE: [employer]   REF #/KEY:    [id number]     STATUSPrior authorization approved  Payer: [employer] [facility] Commercial Case ID: [id number]  Note from payer: PA Case: [id number], Status: Approved, Coverage Starts on: 10/10/2024 12:00:00 AM, Coverage Ends on: 10/10/2025 12:00:00 AM.  Approval Details    Authorized from October 10, 2024 to October 10, 2025  BIN#    [id number]     ID#

## 2024-12-12 PROBLEM — Z98.890 STATUS POST TENDON REPAIR: Status: ACTIVE | Noted: 2024-09-12

## 2024-12-16 ENCOUNTER — APPOINTMENT (OUTPATIENT)
Dept: BEHAVIORAL HEALTH | Facility: CLINIC | Age: 29
End: 2024-12-16
Payer: COMMERCIAL

## 2024-12-17 DIAGNOSIS — F90.0 ATTENTION DEFICIT HYPERACTIVITY DISORDER (ADHD), PREDOMINANTLY INATTENTIVE TYPE: ICD-10-CM

## 2024-12-17 RX ORDER — DEXTROAMPHETAMINE SACCHARATE, AMPHETAMINE ASPARTATE MONOHYDRATE, DEXTROAMPHETAMINE SULFATE AND AMPHETAMINE SULFATE 5; 5; 5; 5 MG/1; MG/1; MG/1; MG/1
20 CAPSULE, EXTENDED RELEASE ORAL EVERY MORNING
Qty: 30 CAPSULE | Refills: 0 | Status: SHIPPED | OUTPATIENT
Start: 2024-12-24 | End: 2025-01-23

## 2025-01-17 NOTE — PROGRESS NOTES
[facility]     Norm Roque Patient Status:  Emergency    3/25/2005 MRN [id number]   Location [facility] EMERGENCY DEPARTMENT Attending Susie Monae MD   Hosp Day # 0 PCP None Pcp     Chief Complaint: abdominal pain, hematemesis     Subjective:    History of Present Illness:     Norm Roque is a 19 year old female with a past medical history of gastric AVMs who presented to the emergency department with abdominal pain and hematemesis.  Patient was recently admitted and had an EGD which showed multiple AVMs which were banded.  Says that she has had follow-up appointments with GI and genetic analysis performed.  She was found to have H. pylori and is being treated with oral antibiotics.  Patient began having worsening abdominal pain over the last several days, began vomiting this morning.  She noted radha blood in her vomit.  Denied having any blood clots.  No bloody stools.  No recent fevers, chills, shortness of breath, chest pain, no other acute complaints at this time.    History/Other:    Past Medical History:  Past Medical History:    Gastric AVM    Upper GI bleed     Past Surgical History:   Past Surgical History:   Procedure Laterality Date    Anesth,intestine endoscopy        Family History:   Family History   Problem Relation Age of Onset    Thyroid Cancer Father 40        s/p surgery    Hyperlipidemia Maternal Grandfather     Heart Attack Maternal Grandfather     Gastro-Intestinal Disorder Paternal Grandfather         bleeding stomach ulcers     Social History:    reports that she has never smoked. She has never used smokeless tobacco. She reports that she does not drink alcohol and does not use drugs.     Allergies: Allergies[1]    Medications:  Medications Ordered Prior to Encounter[2]    Review of Systems:   A comprehensive review of systems was completed.    Pertinent positives and negatives noted in the HPI.    Objective:   Physical Exam:    BP  Provider refilled med and pt was notified   113/76   Pulse 78   Temp 98.7 °F (37.1 °C) (Oral)   Resp 12   Ht 5' 2\" (1.575 m)   Wt 94 lb 12.8 oz (43 kg)   LMP 12/20/2024   SpO2 100%   BMI 17.34 kg/m²   General: No acute distress, Alert, pleasant   Respiratory: No rhonchi, no wheezes  Cardiovascular: S1, S2. Regular rate and rhythm  Abdomen: Soft, Non-tender, non-distended, + bowel sounds  Neuro: No new focal deficits  Extremities: No edema    Results:    Labs:      Labs Last 24 Hours:    Recent Labs   Lab 01/17/25  1226   RBC 5.04   HGB 13.1   HCT 39.9   MCV 79.2*   MCH 26.0   MCHC 32.8   RDW 15.0   NEPRELIM 4.28   WBC 7.5   .0       No results for input(s): \"GLU\", \"BUN\", \"CREATSERUM\", \"GFRAA\", \"GFRNAA\", \"EGFRCR\", \"CA\", \"ALB\", \"NA\", \"K\", \"CL\", \"CO2\", \"ALKPHO\", \"AST\", \"ALT\", \"BILT\", \"TP\" in the last 168 hours.    eGFR cannot be calculated (Patient's most recent lab result is older than the maximum 7 days allowed.).    No results found for: \"PT\", \"INR\"    No results for input(s): \"TROP\", \"TROPHS\", \"CK\" in the last 168 hours.    No results for input(s): \"TROP\", \"PBNP\" in the last 168 hours.    No results for input(s): \"PCT\" in the last 168 hours.    Imaging: Imaging data reviewed in Epic.    Assessment & Plan:      #Gastric AVMs  #intractable abdominal pain  #Hematemesis   S/p EGD with clips placed last month  Hg stable at 13.1  Xray with normal bowel gas pattern   GI consult  IV PPI, IVF's, CLD, NPO at midnight  Trend hg    #H. Pylori  Noted on bx during egd  Resume home tetracycline and flagyl    #Underweight  BMI 17.34  Recent weight loss given abd pain          Plan of care discussed with patient, er physician, nurse     Rodolfo Fernandez MD    Supplementary Documentation:     The 21st Century Cures Act makes medical notes like these available to patients in the interest of transparency. Please be advised this is a medical document. Medical documents are intended to carry relevant information, facts as evident, and the clinical opinion of the  practitioner. The medical note is intended as peer to peer communication and may appear blunt or direct. It is written in medical language and may contain abbreviations or verbiage that are unfamiliar.                                       [1] No Known Allergies  [2]   Current Facility-Administered Medications on File Prior to Encounter   Medication Dose Route Frequency Provider Last Rate Last Admin    [COMPLETED] sodium chloride 0.9 % IV bolus 1,000 mL  1,000 mL Intravenous Once Susie Monae MD   Stopped at 24 1022    [COMPLETED] ketorolac (Toradol) 15 MG/ML injection 15 mg  15 mg Intravenous Once Susie Monae MD   15 mg at 24 0911    [COMPLETED] ondansetron (Zofran) 4 MG/2ML injection 4 mg  4 mg Intravenous Once Susie Monae MD   4 mg at 24 0911    [COMPLETED] famotidine (Pepcid) 20 mg/2mL injection 20 mg  20 mg Intravenous Once Susie Monae MD   20 mg at 24 0915    [COMPLETED] ondansetron (Zofran) 4 MG/2ML injection 4 mg  4 mg Intravenous Once Daren Mckenna MD   4 mg at 24 1250     Current Outpatient Medications on File Prior to Encounter   Medication Sig Dispense Refill    [] Bismuth 262 MG Oral Chew Tab Chew 1 tablet by mouth 4 (four) times daily for 14 days. 56 tablet 0    [] metroNIDAZOLE 500 MG Oral Tab Take 1 tablet (500 mg total) by mouth 3 (three) times daily for 14 days. 42 tablet 0    [] pantoprazole 40 MG Oral Tab EC Take 1 tablet (40 mg total) by mouth 2 (two) times daily before meals for 14 days. Take one tablet (40 mg total) by mouth once daily, 30 minutes prior to breakfast. 28 tablet 0    [] Tetracycline HCl 500 MG Oral Cap Take 1 capsule (500 mg total) by mouth 4 (four) times daily for 14 days. 56 capsule 0    pantoprazole 40 MG Oral Tab EC Take 1 tablet (40 mg total) by mouth 2 (two) times daily before meals. For 4 weeks, then take once tablet once daily. 90 tablet 3    mirtazapine 7.5 MG Oral Tab Take 1 tablet (7.5 mg  total) by mouth nightly as needed.

## 2025-01-29 ENCOUNTER — APPOINTMENT (OUTPATIENT)
Dept: GASTROENTEROLOGY | Facility: CLINIC | Age: 30
End: 2025-01-29
Payer: COMMERCIAL

## 2025-01-29 DIAGNOSIS — F41.3 OTHER MIXED ANXIETY DISORDERS: ICD-10-CM

## 2025-01-29 RX ORDER — FLUOXETINE 10 MG/1
CAPSULE ORAL
Qty: 30 CAPSULE | Refills: 5 | Status: SHIPPED | OUTPATIENT
Start: 2025-01-29

## 2025-02-05 ENCOUNTER — APPOINTMENT (OUTPATIENT)
Dept: GASTROENTEROLOGY | Facility: CLINIC | Age: 30
End: 2025-02-05
Payer: COMMERCIAL

## 2025-02-06 ENCOUNTER — APPOINTMENT (OUTPATIENT)
Dept: BEHAVIORAL HEALTH | Facility: CLINIC | Age: 30
End: 2025-02-06
Payer: COMMERCIAL

## 2025-02-06 DIAGNOSIS — Z00.00 HEALTHCARE MAINTENANCE: ICD-10-CM

## 2025-02-06 DIAGNOSIS — F90.0 ATTENTION DEFICIT HYPERACTIVITY DISORDER (ADHD), PREDOMINANTLY INATTENTIVE TYPE: ICD-10-CM

## 2025-02-06 DIAGNOSIS — Z79.899 MEDICATION MANAGEMENT: ICD-10-CM

## 2025-02-06 DIAGNOSIS — F41.3 OTHER MIXED ANXIETY DISORDERS: ICD-10-CM

## 2025-02-06 DIAGNOSIS — F43.23 ADJUSTMENT DISORDER WITH MIXED ANXIETY AND DEPRESSED MOOD: ICD-10-CM

## 2025-02-06 PROCEDURE — 99214 OFFICE O/P EST MOD 30 MIN: CPT | Performed by: CLINICAL NURSE SPECIALIST

## 2025-02-06 RX ORDER — DEXTROAMPHETAMINE SACCHARATE, AMPHETAMINE ASPARTATE MONOHYDRATE, DEXTROAMPHETAMINE SULFATE AND AMPHETAMINE SULFATE 2.5; 2.5; 2.5; 2.5 MG/1; MG/1; MG/1; MG/1
10 CAPSULE, EXTENDED RELEASE ORAL
Qty: 60 CAPSULE | Refills: 0 | Status: SHIPPED | OUTPATIENT
Start: 2025-04-03 | End: 2025-05-03

## 2025-02-06 RX ORDER — DEXTROAMPHETAMINE SACCHARATE, AMPHETAMINE ASPARTATE MONOHYDRATE, DEXTROAMPHETAMINE SULFATE AND AMPHETAMINE SULFATE 2.5; 2.5; 2.5; 2.5 MG/1; MG/1; MG/1; MG/1
10 CAPSULE, EXTENDED RELEASE ORAL
Qty: 60 CAPSULE | Refills: 0 | Status: SHIPPED | OUTPATIENT
Start: 2025-02-06 | End: 2025-03-08

## 2025-02-06 RX ORDER — DEXTROAMPHETAMINE SACCHARATE, AMPHETAMINE ASPARTATE MONOHYDRATE, DEXTROAMPHETAMINE SULFATE AND AMPHETAMINE SULFATE 2.5; 2.5; 2.5; 2.5 MG/1; MG/1; MG/1; MG/1
10 CAPSULE, EXTENDED RELEASE ORAL
Qty: 60 CAPSULE | Refills: 0 | Status: SHIPPED | OUTPATIENT
Start: 2025-03-06 | End: 2025-04-05

## 2025-02-06 NOTE — PROGRESS NOTES
"Outpatient Psychiatry      Subjective   Yumiko Paredes, is a 29 y.o. female,  seen in follow-up.      Assessment/Plan   Problem List Items Addressed This Visit             ICD-10-CM    Anxiety disorder F41.9     Tolerating and benefiting from current regimen. No indication for medication changes today.      Continue fluoxetine 10 mg PO daily- no refill needed today.   Continue engagement in individual therapy.         Relevant Orders    Follow Up In Psychiatry    RESOLVED: Adjustment disorder with mixed anxiety and depressed mood F43.23     Sx largely resolved. Continue plan as noted above.          Relevant Orders    Follow Up In Psychiatry    Attention deficit hyperactivity disorder (ADHD), predominantly inattentive type F90.0     Discussed possibility that emergence of anxious sx toward evening/end of work day could be r/t waning benefit from stimulant medication. Mutually agreed to trial \"split\" dosing of Adderall- changing from (1) 20 mg cap to (1) 10 mg cap in the morning and (1) 10 mg cap in the early afternoon. Discussed indication(s), reviewed risks/benefits/alternatives.      Change Adderall XR to 10 mg PO daily and mid-day.    Reviewed OARRS, no discrepancies or concerns. Discussed risk of insomnia, anxiety, agitation, anorexia, autonomic effects, toxicity, psychosis, dependence, tolerance, abuse.     Has not yet completed annual UDS- reminded to do so prior to next visit. Orders on chart.          Relevant Medications    amphetamine-dextroamphetamine XR (Adderall XR) 10 mg 24 hr capsule    amphetamine-dextroamphetamine XR (Adderall XR) 10 mg 24 hr capsule (Start on 3/6/2025)    amphetamine-dextroamphetamine XR (Adderall XR) 10 mg 24 hr capsule (Start on 4/3/2025)    Other Relevant Orders    Follow Up In Psychiatry     Other Visit Diagnoses         Codes    Medication management     Z79.899    Relevant Orders    Amphetamine Confirm, Urine    Drug Screen, Urine With Reflex to Confirmation    Healthcare " "maintenance     Z00.00    Relevant Orders    Amphetamine Confirm, Urine    Drug Screen, Urine With Reflex to Confirmation            Follow-up in 3 months.     Risk Assessment:  Risk Assessment: Yumiko Paredes is currently a low acute risk of suicide and self-harm due to no past suicide attempt(s) and not currently endorsing thoughts of suicide. Yumiko Paredes is currently a low acute risk of violence and harm to others due to no past history of violence and not currently threatening others.  Suicidal Risk Factors: , unmarried/single, living alone/lack of social support, and current psychiatric illness  Violence Risk Factors: none  Protective Factors: strong coping skills, sense of responsibility towards family, social support/connectedness, moral objections to suicide, positive family relationships, hopefulness/future orientation, and employment    Reason for Visit:  Follow-up for medication management.     HPI:  Since her last visit,     Things have been going pretty well.   Still some anxiety toward the end of day- commute home from work or when she gets home from work.  Starts to feel irritable, not sure if it has to do with getting hungry, \"I can get hangry, so it's very possible that's part of it.\"   Has been trying to be more mindful of eating regularly throughout the day.   Sleep has been good.   No changes in weight.   Energy level has been good.     Otherwise has been doing well.   Feels like Adderall is beneficial for inattentive sx.   No further depressive sx, and other than end of day, anxiety has been well-controlled.     Denies suicidal ideation or a passive death wish.     No new medications or health problems. No further syncopal episodes since ~May of last year. Completed PT for leg injury, overall has been healing well. Surgeon advised could take up to a year for tendon to fully heal.       Current Psychiatric Medications:  Adderall XR 20 mg PO daily  Fluoxetine 10 mg PO daily      Record " "Review: brief     Medical Review Of Systems:  Pertinent items are noted in HPI.    Psychiatric Review Of Systems:  As noted in HPI.        Objective   Mental Status Exam  General Appearance: Well groomed, appropriate eye contact  Attitude/Behavior: Cooperative  Motor: No psychomotor agitation or retardation, no tremor or other abnormal movements  Speech: Normal rate, volume, prosody  Mood: \"Pretty good.\"  Affect: Euthymic, full-range, Congruent with mood and topic of conversation  Thought Process: Linear, goal directed  Thought Associations: No loosening of associations  Thought Content: Normal  Perception: No perceptual abnormalities noted  Sensorium: Alert and oriented to person, place, time and situation  Insight: Intact  Judgement: Intact  Cognition: Cognitively intact to conversational testing with respect to attention, orientation, fund of knowledge, recent and remote memory, and language    Vitals:  There were no vitals filed for this visit.    Labs:  not applicable        Felisha Milton, APRN-CNP, APRN-CNS  "

## 2025-02-10 PROBLEM — F43.23 ADJUSTMENT DISORDER WITH MIXED ANXIETY AND DEPRESSED MOOD: Status: RESOLVED | Noted: 2024-05-02 | Resolved: 2025-02-10

## 2025-02-10 NOTE — ASSESSMENT & PLAN NOTE
"Discussed possibility that emergence of anxious sx toward evening/end of work day could be r/t waning benefit from stimulant medication. Mutually agreed to trial \"split\" dosing of Adderall- changing from (1) 20 mg cap to (1) 10 mg cap in the morning and (1) 10 mg cap in the early afternoon. Discussed indication(s), reviewed risks/benefits/alternatives.      Change Adderall XR to 10 mg PO daily and mid-day.    Reviewed OARRS, no discrepancies or concerns. Discussed risk of insomnia, anxiety, agitation, anorexia, autonomic effects, toxicity, psychosis, dependence, tolerance, abuse.     Has not yet completed annual UDS- reminded to do so prior to next visit. Orders on chart.   "

## 2025-02-10 NOTE — ASSESSMENT & PLAN NOTE
Tolerating and benefiting from current regimen. No indication for medication changes today.      Continue fluoxetine 10 mg PO daily- no refill needed today.   Continue engagement in individual therapy.

## 2025-02-17 ENCOUNTER — APPOINTMENT (OUTPATIENT)
Dept: GASTROENTEROLOGY | Facility: CLINIC | Age: 30
End: 2025-02-17
Payer: COMMERCIAL

## 2025-02-17 VITALS — HEIGHT: 64 IN | OXYGEN SATURATION: 100 % | HEART RATE: 52 BPM | WEIGHT: 121 LBS | BODY MASS INDEX: 20.66 KG/M2

## 2025-02-17 DIAGNOSIS — K62.89 RECTAL PAIN: Primary | ICD-10-CM

## 2025-02-17 DIAGNOSIS — K64.4 SKIN TAG OF PERIANAL REGION: ICD-10-CM

## 2025-02-17 PROCEDURE — 99203 OFFICE O/P NEW LOW 30 MIN: CPT

## 2025-02-17 PROCEDURE — 3008F BODY MASS INDEX DOCD: CPT

## 2025-02-17 RX ORDER — HYDROCORTISONE 25 MG/G
OINTMENT TOPICAL 2 TIMES DAILY
Qty: 28 G | Refills: 0 | Status: SHIPPED | OUTPATIENT
Start: 2025-02-17

## 2025-02-17 ASSESSMENT — ENCOUNTER SYMPTOMS
COUGH: 0
VOMITING: 0
CHILLS: 0
ABDOMINAL PAIN: 0
NAUSEA: 0
BLOOD IN STOOL: 0
CONSTIPATION: 0
ABDOMINAL DISTENTION: 0
FATIGUE: 0
ANAL BLEEDING: 0
TROUBLE SWALLOWING: 0
SHORTNESS OF BREATH: 0
RECTAL PAIN: 1
APPETITE CHANGE: 0
DIARRHEA: 0
FEVER: 0

## 2025-02-17 NOTE — PATIENT INSTRUCTIONS
Apply ointment rectally twice daily for 1-2 weeks when the rectal area is irritated.  Your exam suggests skin tags, which likely are getting irritated when wiping.   I will refer you to a colorectal specialist to discuss options.  Use soft flushable wipes when moving bowels.

## 2025-02-17 NOTE — PROGRESS NOTES
Subjective     History of Present Illness:   Yumiko Paredes is a 29 y.o. female with PMHx of anal fissure, GERD, anxiety disorder who presents to GI clinic for further evaluation of fissure    Today, patient states she had a fissure in the past and now is experiencing rectal burning sometimes.  Feels like she has 2 lumps in rectal area. Moves bowels once daily to every other day with normal formed stools. Was straining in the past to move bowels, but not recently. Denies heavy lifting.   Denies constipation, diarrhea, dyspepsia, melena, hematochezia, dysphagia, unintentional weight loss    Social ETOH, denies smoking or marijuana  Denies fxh GI cancer or IBD  Abdominal Surgeries: denies    Denies history of colonoscopy   Denies history of EGD       Past Medical History  As per HPI.     Social History  she  reports that she has never smoked. She has never used smokeless tobacco.     Family History  her family history includes Breast cancer in her maternal cousin; No Known Problems in her mother.     Review of Systems  Review of Systems   Constitutional:  Negative for appetite change, chills, fatigue and fever.   HENT:  Negative for trouble swallowing.    Respiratory:  Negative for cough and shortness of breath.    Gastrointestinal:  Positive for rectal pain. Negative for abdominal distention, abdominal pain, anal bleeding, blood in stool, constipation, diarrhea, nausea and vomiting.       Allergies  No Known Allergies    Medications  Current Outpatient Medications   Medication Instructions    amphetamine-dextroamphetamine XR (Adderall XR) 10 mg 24 hr capsule 20 mg, oral, Daily, Do not crush or chew.    amphetamine-dextroamphetamine XR (Adderall XR) 10 mg 24 hr capsule 10 mg, oral, 2 times daily (morning and midday), Do not crush or chew.    [START ON 3/6/2025] amphetamine-dextroamphetamine XR (Adderall XR) 10 mg 24 hr capsule 10 mg, oral, 2 times daily (morning and midday), Do not crush or chew.    [START ON  4/3/2025] amphetamine-dextroamphetamine XR (Adderall XR) 10 mg 24 hr capsule 10 mg, oral, 2 times daily (morning and midday), Do not crush or chew.    amphetamine-dextroamphetamine XR (Adderall XR) 20 mg 24 hr capsule 20 mg, oral, Every morning, Do not crush or chew.    cephalexin (Keflex) 500 mg capsule 1 capsule, oral, Every 12 hours scheduled (0630,1830)    cephalexin (Keflex) 500 mg capsule oral    fluconazole (Diflucan) 150 mg tablet oral    FLUoxetine (PROzac) 10 mg capsule TAKE 1 CAPSULE(10 MG) BY MOUTH EVERY DAY    hydrocortisone 2.5 % ointment Topical, 2 times daily    levonorgestreL (Kyleena) 17.5 mcg/24 hrs (5 yrs) 19.5 mg intrauterine device intrauterine    multivit-min/ferrous fumarate (MULTI VITAMIN ORAL) oral    multivitamin tablet oral    nitrofurantoin, macrocrystal-monohydrate, (Macrobid) 100 mg capsule oral    oxyCODONE-acetaminophen (Percocet) 5-325 mg tablet take 1 tablet by mouth every 4 to 6 hours as needed    tobramycin-dexamethasone (Tobradex) ophthalmic suspension 1-2 DROPS LEFT EYE FOUR TIMES DAILY FOR 3 DAYS THEN THREE TIMES DAILY FOR 3 DAYS THEN TWICE DAILY FOR 3 DAYS THAN EVERY DAY FOR 3 DAYS THEN DAILY/CAPSULE        Objective   Visit Vitals  Pulse 52      Physical Exam  Constitutional:       Appearance: Normal appearance. She is normal weight.   HENT:      Mouth/Throat:      Mouth: Mucous membranes are dry.      Pharynx: Oropharynx is clear.   Cardiovascular:      Rate and Rhythm: Normal rate and regular rhythm.   Pulmonary:      Effort: Pulmonary effort is normal.      Breath sounds: Normal breath sounds. No wheezing or rhonchi.   Abdominal:      General: Abdomen is flat. Bowel sounds are normal. There is no distension.      Palpations: Abdomen is soft. There is no hepatomegaly.      Tenderness: There is no abdominal tenderness. There is no guarding or rebound. Negative signs include Nails's sign.      Hernia: No hernia is present.   Genitourinary:     Comments: Rectal exam  performed.  Patient declined chaperone.  2 perianal skin tags left side of rectum, otherwise appeared normal.  No rectal bleeding detected  Musculoskeletal:         General: Normal range of motion.   Skin:     General: Skin is warm and dry.   Neurological:      General: No focal deficit present.      Mental Status: She is alert and oriented to person, place, and time.   Psychiatric:         Mood and Affect: Mood normal.         Behavior: Behavior normal.           Lab Results   Component Value Date    WBC 6.7 07/17/2024    HGB 12.9 07/17/2024    HCT 37.6 07/17/2024     07/17/2024     Lab Results   Component Value Date     07/17/2024    K 3.8 07/17/2024     07/17/2024    CO2 24 07/17/2024    BUN 9 07/17/2024    CREATININE 0.71 07/17/2024    CALCIUM 9.5 07/17/2024    PROT 6.7 07/17/2024    BILITOT 0.8 07/17/2024    ALKPHOS 37 07/17/2024    ALT 9 07/17/2024    AST 15 07/17/2024    GLUCOSE 86 07/17/2024           Yumiko MITCHELL Lena is a 29 y.o. female who presents to GI clinic for rectal discomfort.    Skin tag of perianal region  Rectal exam suggestive of 2 perianal skin tags  -Colorectal referral    Rectal pain  Recommend patient use soft flushable wipes when moving bowels along with as needed hydrocortisone ointment when she experiences rectal burning    Follow-up as needed         Sue Giles, APRN-CNP

## 2025-02-17 NOTE — ASSESSMENT & PLAN NOTE
Recommend patient use soft flushable wipes when moving bowels along with as needed hydrocortisone ointment when she experiences rectal burning    Follow-up as needed

## 2025-02-25 ENCOUNTER — OFFICE VISIT (OUTPATIENT)
Dept: SURGERY | Facility: CLINIC | Age: 30
End: 2025-02-25
Payer: COMMERCIAL

## 2025-02-25 VITALS
HEART RATE: 71 BPM | HEIGHT: 64 IN | BODY MASS INDEX: 21 KG/M2 | WEIGHT: 123 LBS | DIASTOLIC BLOOD PRESSURE: 60 MMHG | SYSTOLIC BLOOD PRESSURE: 98 MMHG | TEMPERATURE: 98.2 F

## 2025-02-25 DIAGNOSIS — K64.4 SKIN TAG OF PERIANAL REGION: ICD-10-CM

## 2025-02-25 PROCEDURE — 46600 DIAGNOSTIC ANOSCOPY SPX: CPT | Performed by: NURSE PRACTITIONER

## 2025-02-25 PROCEDURE — 99213 OFFICE O/P EST LOW 20 MIN: CPT | Mod: 25 | Performed by: NURSE PRACTITIONER

## 2025-02-25 PROCEDURE — 3008F BODY MASS INDEX DOCD: CPT | Performed by: NURSE PRACTITIONER

## 2025-02-25 PROCEDURE — 99203 OFFICE O/P NEW LOW 30 MIN: CPT | Performed by: NURSE PRACTITIONER

## 2025-02-25 PROCEDURE — 1036F TOBACCO NON-USER: CPT | Performed by: NURSE PRACTITIONER

## 2025-02-25 ASSESSMENT — PAIN SCALES - GENERAL: PAINLEVEL_OUTOF10: 0-NO PAIN

## 2025-02-25 NOTE — PROGRESS NOTES
History Of Present Illness  Yumiko Paredes is a 29 y.o. female presenting with anal discomfort.     She has had anal fissures in the past from a hx of chronic constipation and now has anal skin tags.  She has been having anal discomfort off and on for the past 6 months.  The pain can be with the BM and then subsides quickly.   She will have a scant amount of  rectal bleeding every few weeks.    She will have a soft BM every day to every other day with rare straining.  She does not sit long on the toilet to have a BM.  She takes psyllium daily.  No c/o any accidents or leakage of stool.  No hx of any perianal surgeries.    Hx of a pilonidal cyst surgery age 16 and 17.    No colonoscopy    NO family hx of colorectal cancer.    Past Medical History  She has a past medical history of Anxiety disorder, unspecified, Atypical squamous cells of undetermined significance on cytologic smear of cervix (ASC-US) (08/09/2018), Encounter for pregnancy test, result unknown, Other specified health status, and Personal history of other diseases of the digestive system (08/10/2018).    Surgical History  She has a past surgical history that includes Other surgical history (10/21/2013).     Social History  She reports that she has never smoked. She has never used smokeless tobacco. She reports current alcohol use of about 3.0 standard drinks of alcohol per week. She reports that she does not use drugs.    Family History  Family History   Problem Relation Name Age of Onset    No Known Problems Mother      Breast cancer Maternal Cousin          Allergies  Patient has no known allergies.    Review of Systems   All other systems reviewed and are negative.       Physical Exam  Exam conducted with a chaperone present.   Constitutional:       Appearance: Normal appearance.   HENT:      Head: Normocephalic and atraumatic.   Pulmonary:      Effort: Pulmonary effort is normal.   Musculoskeletal:         General: Normal range of motion.   Skin:      General: Skin is warm and dry.   Neurological:      General: No focal deficit present.      Mental Status: She is alert and oriented to person, place, and time.   Psychiatric:         Mood and Affect: Mood normal.         Behavior: Behavior normal.       Anoscopy    Date/Time: 2/25/2025 9:03 AM    Performed by: RUDY Choe  Authorized by: RUDY Choe    Consent:     Consent obtained:  Verbal    Consent given by:  Patient    Risks, benefits, and alternatives were discussed: yes    Universal protocol:     Procedure explained and questions answered to patient or proxy's satisfaction: yes      Patient identity confirmed:  Verbally with patient  Post-procedure details:     Procedure completion:  Tolerated  Comments:      She has a non-inflamed very tiny sentinel pile in the anterior and posterior midline.  Good tone on ANIKA, no pain.  ON anoscopy, looking in 360 degrees, she has no irritation of the internal hemorrhoids.      Last Recorded Vitals  BP 98/60   Pulse 71   Temp 36.8 °C (98.2 °F) (Temporal)   Wt 55.8 kg (123 lb)        Assessment/Plan   Yumiko has non-inflamed very tiny sentinel piles in the anterior and posterior midline position.  We talked about removal of the piles and she would like to think about it.  She will continue to increase her fiber and water intake to keep her stools soft.  She will call with any issues and will follow up on an as needed basis.         RUDY Choe

## 2025-05-01 ENCOUNTER — APPOINTMENT (OUTPATIENT)
Dept: BEHAVIORAL HEALTH | Facility: CLINIC | Age: 30
End: 2025-05-01
Payer: COMMERCIAL

## 2025-07-21 ENCOUNTER — TELEPHONE (OUTPATIENT)
Dept: OTHER | Age: 30
End: 2025-07-21
Payer: COMMERCIAL

## 2025-07-30 ENCOUNTER — TELEPHONE (OUTPATIENT)
Dept: BEHAVIORAL HEALTH | Facility: CLINIC | Age: 30
End: 2025-07-30

## 2025-07-31 DIAGNOSIS — F90.0 ATTENTION DEFICIT HYPERACTIVITY DISORDER (ADHD), PREDOMINANTLY INATTENTIVE TYPE: ICD-10-CM

## 2025-07-31 RX ORDER — DEXTROAMPHETAMINE SACCHARATE, AMPHETAMINE ASPARTATE MONOHYDRATE, DEXTROAMPHETAMINE SULFATE AND AMPHETAMINE SULFATE 2.5; 2.5; 2.5; 2.5 MG/1; MG/1; MG/1; MG/1
20 CAPSULE, EXTENDED RELEASE ORAL DAILY
Qty: 60 CAPSULE | Refills: 0 | Status: SHIPPED | OUTPATIENT
Start: 2025-07-31 | End: 2025-08-30

## 2025-08-11 ENCOUNTER — TELEMEDICINE (OUTPATIENT)
Dept: BEHAVIORAL HEALTH | Facility: CLINIC | Age: 30
End: 2025-08-11
Payer: COMMERCIAL

## 2025-08-29 DIAGNOSIS — F41.3 OTHER MIXED ANXIETY DISORDERS: ICD-10-CM

## 2025-08-29 RX ORDER — FLUOXETINE 10 MG/1
10 CAPSULE ORAL
Qty: 90 CAPSULE | OUTPATIENT
Start: 2025-08-29